# Patient Record
Sex: MALE | Race: OTHER | HISPANIC OR LATINO | URBAN - METROPOLITAN AREA
[De-identification: names, ages, dates, MRNs, and addresses within clinical notes are randomized per-mention and may not be internally consistent; named-entity substitution may affect disease eponyms.]

---

## 2020-10-14 ENCOUNTER — INPATIENT (INPATIENT)
Facility: HOSPITAL | Age: 40
LOS: 1 days | Discharge: HOME | DRG: 123 | End: 2020-10-16
Attending: HOSPITALIST | Admitting: HOSPITALIST
Payer: MEDICAID

## 2020-10-14 VITALS
WEIGHT: 235.01 LBS | HEART RATE: 61 BPM | DIASTOLIC BLOOD PRESSURE: 75 MMHG | HEIGHT: 66 IN | RESPIRATION RATE: 18 BRPM | TEMPERATURE: 98 F | OXYGEN SATURATION: 97 % | SYSTOLIC BLOOD PRESSURE: 145 MMHG

## 2020-10-14 DIAGNOSIS — H53.8 OTHER VISUAL DISTURBANCES: ICD-10-CM

## 2020-10-14 LAB
ALBUMIN SERPL ELPH-MCNC: 4.6 G/DL — SIGNIFICANT CHANGE UP (ref 3.3–5.2)
ALP SERPL-CCNC: 81 U/L — SIGNIFICANT CHANGE UP (ref 40–120)
ALT FLD-CCNC: 78 U/L — HIGH
AMPHET UR-MCNC: NEGATIVE — SIGNIFICANT CHANGE UP
ANION GAP SERPL CALC-SCNC: 10 MMOL/L — SIGNIFICANT CHANGE UP (ref 5–17)
AST SERPL-CCNC: 42 U/L — HIGH
BARBITURATES UR SCN-MCNC: NEGATIVE — SIGNIFICANT CHANGE UP
BASOPHILS # BLD AUTO: 0.05 K/UL — SIGNIFICANT CHANGE UP (ref 0–0.2)
BASOPHILS NFR BLD AUTO: 0.7 % — SIGNIFICANT CHANGE UP (ref 0–2)
BENZODIAZ UR-MCNC: NEGATIVE — SIGNIFICANT CHANGE UP
BILIRUB SERPL-MCNC: 0.3 MG/DL — LOW (ref 0.4–2)
BUN SERPL-MCNC: 18 MG/DL — SIGNIFICANT CHANGE UP (ref 8–20)
CALCIUM SERPL-MCNC: 9.5 MG/DL — SIGNIFICANT CHANGE UP (ref 8.6–10.2)
CHLORIDE SERPL-SCNC: 103 MMOL/L — SIGNIFICANT CHANGE UP (ref 98–107)
CO2 SERPL-SCNC: 27 MMOL/L — SIGNIFICANT CHANGE UP (ref 22–29)
COCAINE METAB.OTHER UR-MCNC: NEGATIVE — SIGNIFICANT CHANGE UP
CREAT SERPL-MCNC: 0.87 MG/DL — SIGNIFICANT CHANGE UP (ref 0.5–1.3)
CRP SERPL-MCNC: <0.4 MG/DL — SIGNIFICANT CHANGE UP (ref 0–0.4)
EOSINOPHIL # BLD AUTO: 0.22 K/UL — SIGNIFICANT CHANGE UP (ref 0–0.5)
EOSINOPHIL NFR BLD AUTO: 3 % — SIGNIFICANT CHANGE UP (ref 0–6)
ERYTHROCYTE [SEDIMENTATION RATE] IN BLOOD: 10 MM/HR — SIGNIFICANT CHANGE UP (ref 0–20)
GLUCOSE SERPL-MCNC: 107 MG/DL — HIGH (ref 70–99)
HCT VFR BLD CALC: 46 % — SIGNIFICANT CHANGE UP (ref 39–50)
HGB BLD-MCNC: 14.9 G/DL — SIGNIFICANT CHANGE UP (ref 13–17)
IMM GRANULOCYTES NFR BLD AUTO: 0.3 % — SIGNIFICANT CHANGE UP (ref 0–1.5)
LYMPHOCYTES # BLD AUTO: 2.58 K/UL — SIGNIFICANT CHANGE UP (ref 1–3.3)
LYMPHOCYTES # BLD AUTO: 35.1 % — SIGNIFICANT CHANGE UP (ref 13–44)
MCHC RBC-ENTMCNC: 29.9 PG — SIGNIFICANT CHANGE UP (ref 27–34)
MCHC RBC-ENTMCNC: 32.4 GM/DL — SIGNIFICANT CHANGE UP (ref 32–36)
MCV RBC AUTO: 92.4 FL — SIGNIFICANT CHANGE UP (ref 80–100)
METHADONE UR-MCNC: NEGATIVE — SIGNIFICANT CHANGE UP
MONOCYTES # BLD AUTO: 0.56 K/UL — SIGNIFICANT CHANGE UP (ref 0–0.9)
MONOCYTES NFR BLD AUTO: 7.6 % — SIGNIFICANT CHANGE UP (ref 2–14)
NEUTROPHILS # BLD AUTO: 3.91 K/UL — SIGNIFICANT CHANGE UP (ref 1.8–7.4)
NEUTROPHILS NFR BLD AUTO: 53.3 % — SIGNIFICANT CHANGE UP (ref 43–77)
OPIATES UR-MCNC: NEGATIVE — SIGNIFICANT CHANGE UP
PCP SPEC-MCNC: SIGNIFICANT CHANGE UP
PCP UR-MCNC: NEGATIVE — SIGNIFICANT CHANGE UP
PLATELET # BLD AUTO: 187 K/UL — SIGNIFICANT CHANGE UP (ref 150–400)
POTASSIUM SERPL-MCNC: 4.5 MMOL/L — SIGNIFICANT CHANGE UP (ref 3.5–5.3)
POTASSIUM SERPL-SCNC: 4.5 MMOL/L — SIGNIFICANT CHANGE UP (ref 3.5–5.3)
PROT SERPL-MCNC: 7.5 G/DL — SIGNIFICANT CHANGE UP (ref 6.6–8.7)
RBC # BLD: 4.98 M/UL — SIGNIFICANT CHANGE UP (ref 4.2–5.8)
RBC # FLD: 12.3 % — SIGNIFICANT CHANGE UP (ref 10.3–14.5)
SARS-COV-2 RNA SPEC QL NAA+PROBE: SIGNIFICANT CHANGE UP
SODIUM SERPL-SCNC: 140 MMOL/L — SIGNIFICANT CHANGE UP (ref 135–145)
THC UR QL: POSITIVE
WBC # BLD: 7.34 K/UL — SIGNIFICANT CHANGE UP (ref 3.8–10.5)
WBC # FLD AUTO: 7.34 K/UL — SIGNIFICANT CHANGE UP (ref 3.8–10.5)

## 2020-10-14 PROCEDURE — 99285 EMERGENCY DEPT VISIT HI MDM: CPT

## 2020-10-14 PROCEDURE — 99223 1ST HOSP IP/OBS HIGH 75: CPT

## 2020-10-14 RX ORDER — THIAMINE MONONITRATE (VIT B1) 100 MG
100 TABLET ORAL DAILY
Refills: 0 | Status: DISCONTINUED | OUTPATIENT
Start: 2020-10-14 | End: 2020-10-16

## 2020-10-14 RX ORDER — PANTOPRAZOLE SODIUM 20 MG/1
40 TABLET, DELAYED RELEASE ORAL
Refills: 0 | Status: DISCONTINUED | OUTPATIENT
Start: 2020-10-14 | End: 2020-10-16

## 2020-10-14 RX ORDER — ACETAMINOPHEN 500 MG
650 TABLET ORAL EVERY 6 HOURS
Refills: 0 | Status: DISCONTINUED | OUTPATIENT
Start: 2020-10-14 | End: 2020-10-16

## 2020-10-14 RX ORDER — FOLIC ACID 0.8 MG
1 TABLET ORAL DAILY
Refills: 0 | Status: DISCONTINUED | OUTPATIENT
Start: 2020-10-14 | End: 2020-10-16

## 2020-10-14 RX ORDER — NICOTINE POLACRILEX 2 MG
1 GUM BUCCAL DAILY
Refills: 0 | Status: DISCONTINUED | OUTPATIENT
Start: 2020-10-14 | End: 2020-10-16

## 2020-10-14 RX ADMIN — Medication 58 MILLIGRAM(S): at 15:56

## 2020-10-14 NOTE — CONSULT NOTE ADULT - SUBJECTIVE AND OBJECTIVE BOX
[facility]                                     Neurology at [place]                                 Amber Soto, & Dakota                                  [address]                                        [place], [place], [address]                                             [phone]    CC: right eye vision changes  HPI: The patient is a 39y Male who presented with 16 days of blurred vision in right eye with pain on movement.  He saw an optometrist who was concerned for optic neuritis and he was sent to the ED.  Neurology is asked to evaluate    PAST MEDICAL & SURGICAL HISTORY:  No pertinent past medical history    No significant past surgical history        MEDICATIONS  (STANDING):    MEDICATIONS  (PRN):  acetaminophen   Tablet .. 650 milliGRAM(s) Oral every 6 hours PRN Moderate Pain (4 - 6)      Allergies    No Known Allergies    Intolerances        SOCIAL HISTORY:  (+) tob,   social alcohol   no drugs    FAMILY HISTORY:  Family history of diabetes mellitus (DM) (Mother)  no CVA in either parent      ROS: 14 point ROS negative other than what is present in HPI or below    Vital Signs Last 24 Hrs  T(C): 36.8 (14 Oct 2020 13:54), Max: 36.8 (14 Oct 2020 13:54)  T(F): 98.3 (14 Oct 2020 13:54), Max: 98.3 (14 Oct 2020 13:54)  HR: 61 (14 Oct 2020 13:54) (61 - 61)  BP: 145/75 (14 Oct 2020 13:54) (145/75 - 145/75)  BP(mean): --  RR: 18 (14 Oct 2020 13:54) (18 - 18)  SpO2: 97% (14 Oct 2020 13:54) (97% - 97%)      General: NAD    Detailed Neurologic Exam:    Mental status: The patient is awake and alert and has normal attention span.  The patient is fully oriented in 3 spheres. The patient is oriented to current events. The patient is able to name objects, follow commands, repeat sentences.    Cranial nerves: Right APD, mild.  Blurred vision in right eye. There is no visual field deficit to confrontation. Extraocular motion is full with no nystagmus. There is no ptosis. Facial sensation is intact. Facial musculature is symmetric. Palate elevates symmetrically. Shoulder shrug is normal. Tongue is midline.  Right optic nerve pallor on fundoscopic exam    Motor: There is normal bulk and tone.  There is no tremor.  Strength is 5/5 in the right arm and leg.   Strength is 5/5 in the left arm and leg.    Sensation: Intact to light touch and pin in 4 extremities    Reflexes: 2+ throughout and plantar responses are flexor.    Cerebellar: There is no dysmetria on finger to nose testing.    Gait : deferred    LABS:                         14.9   7.34  )-----------( 187      ( 14 Oct 2020 16:10 )             46.0       10-14    140  |  103  |  18.0  ----------------------------<  107<H>  4.5   |  27.0  |  0.87    Ca    9.5      14 Oct 2020 16:10    TPro  7.5  /  Alb  4.6  /  TBili  0.3<L>  /  DBili  x   /  AST  42<H>  /  ALT  78<H>  /  AlkPhos  81  10-14      RADIOLOGY & ADDITIONAL STUDIES (independently reviewed unless otherwise noted):  no neuro studies

## 2020-10-14 NOTE — ED PROVIDER NOTE - CLINICAL SUMMARY MEDICAL DECISION MAKING FREE TEXT BOX
right blurry vision for two weeks; optic swelling on eye exam; labs, MRI right blurry vision for two weeks; optic swelling on eye exam; labs, MRI; admit to obs

## 2020-10-14 NOTE — H&P ADULT - NSICDXFAMILYHX_GEN_ALL_CORE_FT
FAMILY HISTORY:  Mother  Still living? Unknown  Family history of diabetes mellitus (DM), Age at diagnosis: Age Unknown

## 2020-10-14 NOTE — ED PROVIDER NOTE - PROGRESS NOTE DETAILS
Patient signed out to me by Dr. Lema- patient with acute, painful R-sided vision loss x 3 days, seen by optometrist who examined patient, found vision loss, optic nerve swelling. Patient with 20/200 R eye, 20/25 L eye. D/w Dr. Clark (neurology)- history and exam c/w optic neuritis. Recommending IV solu-medrol x 3 days and admission to hospital. Will see patient in ED. Will admit patient ot hospital pending labs. Maggi Candelario DO PA NOTE: Case discussed with MD Cochran Patient signed out to me by Dr. Lema- patient with acute, painful R-sided vision loss x 3 days, seen by optometrist who examined patient, found vision loss, optic nerve swelling. Patient with visual acuity 20/200 R eye, 20/25 L eye. Unable to visualize optic disk on fundoscopic exam. D/w Dr. Clakr (neurology)- history and exam c/w optic neuritis. Recommending IV solu-medrol x 3 days and admission to hospital. Will see patient in ED. Will admit patient ot hospital pending labs. Maggi Candelario DO

## 2020-10-14 NOTE — ED PROVIDER NOTE - NSTIMEPROVIDERCAREINITIATE_GEN_ER
Bedside and Verbal shift change received from Donn Burgess RN. Report included the following information: SBAR, ED Summary, MAR and Recent Results. Patient just returned from x-ray. Asking for pain medication.  Will consult MD.
Bedside shift change report given to Kurtis Tipton 44  (oncoming nurse) by Harriett Rush RN  (offgoing nurse). Report included the following information SBAR, Kardex and ED Summary.
Discharge instructions reviewed with patient. Discharge instructions given to patient per Dr. Lorilee Spurling. Patient able to return/verbalize discharge instructions. Copy of discharge instructions provided. Patient condition stable, respiratory status within normal limits, neuro status intact. Ambulatory out of ER.  Transportation home provided by 
Patient resting comfortably in stretcher. No concerns at this time.
Pt states she was having chest pain yesterday that lasted a few hours and she states she ahd small amount of relief after taking propanolol. Pain started again around 0300 with out relief. Pt has Cardiac history of rapid HR and sees MD Antonia Ernst. Pt has hx of total hysterectomy. Denies shortness of breath. VS stable.
14-Oct-2020 14:15

## 2020-10-14 NOTE — H&P ADULT - ASSESSMENT
38 y/o M with no PMH was sent by his Ophthalmologist for the right side decrease vision    Plan:     Right eye vision loss r/o Optic neuritis: Admit under medicine, Neuro checks, pulse steroids, will follow Neurologist called by ED, will get TSH, Hba1c and lipid panel, will follow MRI of brain orbit ordered by ED      Hx of smoking, will give nicotine patches, counselled for cessation.     Hx of drinking beers: will monitor for withdrawal, will supplement thiamine, folic acid and multivitamin.     Hx of use weed, will get Utox.     DVT prophylaxis: ambulation, SCDs    GI prophylaxis: Protonix    Dispo: will see what MRI shows then accordingly.    40 y/o M with no PMH was sent by his Ophthalmologist for the right side decrease vision    Plan:     Right eye vision loss r/o Optic neuritis: Admit under medicine, Neuro checks, pulse steroids, will follow Neurologist called by ED, will get TSH, Hba1c and lipid panel, will follow MRI of brain, orbit, cervical and lumber spine, will get ESR, CRP, CAHNTE, there is no temporal headache.      Hx of smoking, will give nicotine patches, counselled for cessation.     Hx of drinking beers: will monitor for withdrawal, will supplement thiamine, folic acid and multivitamin.     Hx of use weed, will get Utox.     DVT prophylaxis: ambulation, SCDs    GI prophylaxis: Protonix oral    Dispo: will see what MRI shows then go accordingly.

## 2020-10-14 NOTE — H&P ADULT - NSHPPHYSICALEXAM_GEN_ALL_CORE
Vital Signs Last 24 Hrs  T(C): 36.8 (14 Oct 2020 13:54), Max: 36.8 (14 Oct 2020 13:54)  T(F): 98.3 (14 Oct 2020 13:54), Max: 98.3 (14 Oct 2020 13:54)  HR: 61 (14 Oct 2020 13:54) (61 - 61)  BP: 145/75 (14 Oct 2020 13:54) (145/75 - 145/75)  BP(mean): --  RR: 18 (14 Oct 2020 13:54) (18 - 18)  SpO2: 97% (14 Oct 2020 13:54) (97% - 97%)    PHYSICAL EXAM:    GENERAL: Elderly male looking   HEENT: PERRL, +EOMI  NECK: soft, Supple, No JVD,   CHEST/LUNG: Clear to auscultate bilaterally; No wheezing  HEART: S1S2+, Regular rate and rhythm; No murmurs, rubs, or gallops  ABDOMEN: Soft, Nontender, Nondistended; Bowel sounds present  EXTREMITIES:  2+ Peripheral Pulses, No clubbing, cyanosis, or edema  SKIN: No rashes or lesions  NEURO: AAOX3, no focal deficits, no motor r sensory loss  PSYCH: normal mood Vital Signs Last 24 Hrs  T(C): 36.8 (14 Oct 2020 13:54), Max: 36.8 (14 Oct 2020 13:54)  T(F): 98.3 (14 Oct 2020 13:54), Max: 98.3 (14 Oct 2020 13:54)  HR: 61 (14 Oct 2020 13:54) (61 - 61)  BP: 145/75 (14 Oct 2020 13:54) (145/75 - 145/75)  RR: 18 (14 Oct 2020 13:54) (18 - 18)  SpO2: 97% (14 Oct 2020 13:54) (97% - 97%)    PHYSICAL EXAM:    GENERAL: Young male looking comfortable   HEENT: atraumatic, pupils reactive to the light, EOMI, no conjunctival injection or redness   NECK: soft, Supple, No JVD,   CHEST/LUNG: Clear to auscultate bilaterally; No wheezing  HEART: S1S2+, Regular rate and rhythm; No murmurs  ABDOMEN: Soft, Nontender, Nondistended; Bowel sounds present  EXTREMITIES:  2+ Peripheral Pulses, No edema  SKIN: No rashes or lesions  NEURO: AAOX3, no focal deficits, no motor r sensory loss, his visual field in intact, no  facial droop  PSYCH: normal mood

## 2020-10-14 NOTE — ED ADULT TRIAGE NOTE - CHIEF COMPLAINT QUOTE
Pt c/o right eye pain, blurred vision x 2 weeks.  Sent from PMD.  Referral states exam showed optic nerve swelling.  Requesting MRI.

## 2020-10-14 NOTE — ED CDU PROVIDER INITIAL DAY NOTE - FAMILY HISTORY
Mother  Still living? Unknown  Family history of diabetes mellitus (DM), Age at diagnosis: Age Unknown

## 2020-10-14 NOTE — ED ADULT NURSE NOTE - OBJECTIVE STATEMENT
assumed pt care at 1500. Pt A&Ox 4 states he "lost vision in right eye" 2 weeks ago. Pt states he can see "some" it is just dark and blurry. Pt denies any other symptoms at this time. Denies any chest pain, SOB, N/V/D, HA, dizziness, numbness/tingling,  symptoms. No s/s of respiratory distress noted- respirations even & unlabored. Safety maintained. Will continue to monitor.

## 2020-10-14 NOTE — CONSULT NOTE ADULT - ASSESSMENT
The patient is a 39y Male who is followed by neurology because of right optic neuritis    Optic neuritis  IV solumedrol 1 gram daily for three doses  MRI brain/orbits, Cervical and thoracic spine wwo steffany  need to evaluate for MS and Devics disease although has no previous history to suggest MS  will send NMO antibodies    will follow with you    Edwin Clark MD PhD   [id number]

## 2020-10-14 NOTE — ED CDU PROVIDER INITIAL DAY NOTE - OBJECTIVE STATEMENT
Pt is a 38 y/o m, denies PMH, presents to ED c/o blurry vision x 2 weeks. Pt states he has been having progressively worsened vision in his R eye for the last 2 weeks. Pt was seen by his optometrist ( Dr. Suze Escobedo ) today and noted to have optic nerve swelling and sent to the ED for an MRI to assess for optic neuritis. Pt has no complaints of pain at this time. Denies HA, dizziness, nausea, vomiting.

## 2020-10-14 NOTE — H&P ADULT - NSHPREVIEWOFSYSTEMS_GEN_ALL_CORE
CONSTITUTIONAL: No fever, no fatigue  RESPIRATORY: No cough, No shortness of breath  CARDIOVASCULAR: No chest pain, palpitations  GASTROINTESTINAL: No abdominal, No nausea, vomiting  NEUROLOGICAL: No headaches,  loss of strength.  MISCELLANEOUS: No joint swelling or pain

## 2020-10-14 NOTE — ED CDU PROVIDER INITIAL DAY NOTE - MEDICAL DECISION MAKING DETAILS
39m sent from optometrist office for MRI to assess for optic neuritis. Pt with R eye blurry vision x 2 weeks.

## 2020-10-14 NOTE — H&P ADULT - HISTORY OF PRESENT ILLNESS
40 y/o M with no PMH was sent by his Ophthalmologist for the right side decrease vision 38 y/o M with no PMH was sent by his Ophthalmologist for the right side blurry vision for 16 days with pain on movement, as per patient it has been worsening, he saw the optalmologist he sent him to the ED to get MRI done, as per patient, he has no weakness, numbness, dizziness, some time get some headache, has no fever, chills, chest pain, joint pain, rashes, he has no watering of eye or redness or the eye, he has no issues with his left eye, he has no recent travel, sick contact, no hx of trauma.

## 2020-10-14 NOTE — H&P ADULT - NSHPLABSRESULTS_GEN_ALL_CORE
14.9   7.34  )-----------( 187      ( 14 Oct 2020 16:10 )             46.0     10-14    140  |  103  |  18.0  ----------------------------<  107<H>  4.5   |  27.0  |  0.87    Ca    9.5      14 Oct 2020 16:10    TPro  7.5  /  Alb  4.6  /  TBili  0.3<L>  /  DBili  x   /  AST  42<H>  /  ALT  78<H>  /  AlkPhos  81  10-14

## 2020-10-14 NOTE — ED ADULT NURSE NOTE - ED STAT RN HANDOFF DETAILS
report given to ARLENE Erwin in holding room. Pt stable, no complaints at this time. Awaiting MRI's, pt aware of plan of care. NAD noted. Safety maintained

## 2020-10-15 LAB
A1C WITH ESTIMATED AVERAGE GLUCOSE RESULT: 6.3 % — HIGH (ref 4–5.6)
ALBUMIN SERPL ELPH-MCNC: 4.7 G/DL — SIGNIFICANT CHANGE UP (ref 3.3–5.2)
ALP SERPL-CCNC: 69 U/L — SIGNIFICANT CHANGE UP (ref 40–120)
ALT FLD-CCNC: 70 U/L — HIGH
ANION GAP SERPL CALC-SCNC: 15 MMOL/L — SIGNIFICANT CHANGE UP (ref 5–17)
AST SERPL-CCNC: 31 U/L — SIGNIFICANT CHANGE UP
BILIRUB SERPL-MCNC: 0.3 MG/DL — LOW (ref 0.4–2)
BUN SERPL-MCNC: 19 MG/DL — SIGNIFICANT CHANGE UP (ref 8–20)
CALCIUM SERPL-MCNC: 9.2 MG/DL — SIGNIFICANT CHANGE UP (ref 8.6–10.2)
CHLORIDE SERPL-SCNC: 103 MMOL/L — SIGNIFICANT CHANGE UP (ref 98–107)
CHOLEST SERPL-MCNC: 188 MG/DL — SIGNIFICANT CHANGE UP (ref 110–199)
CO2 SERPL-SCNC: 20 MMOL/L — LOW (ref 22–29)
CREAT SERPL-MCNC: 0.58 MG/DL — SIGNIFICANT CHANGE UP (ref 0.5–1.3)
CRP SERPL-MCNC: <0.4 MG/DL — SIGNIFICANT CHANGE UP (ref 0–0.4)
ERYTHROCYTE [SEDIMENTATION RATE] IN BLOOD: 10 MM/HR — SIGNIFICANT CHANGE UP (ref 0–20)
ESTIMATED AVERAGE GLUCOSE: 134 MG/DL — HIGH (ref 68–114)
GLUCOSE SERPL-MCNC: 184 MG/DL — HIGH (ref 70–99)
HCT VFR BLD CALC: 44.6 % — SIGNIFICANT CHANGE UP (ref 39–50)
HDLC SERPL-MCNC: 54 MG/DL — SIGNIFICANT CHANGE UP
HGB BLD-MCNC: 14.8 G/DL — SIGNIFICANT CHANGE UP (ref 13–17)
INR BLD: 1.07 RATIO — SIGNIFICANT CHANGE UP (ref 0.88–1.16)
LIPID PNL WITH DIRECT LDL SERPL: 125 MG/DL — SIGNIFICANT CHANGE UP
MAGNESIUM SERPL-MCNC: 2 MG/DL — SIGNIFICANT CHANGE UP (ref 1.6–2.6)
MCHC RBC-ENTMCNC: 29.8 PG — SIGNIFICANT CHANGE UP (ref 27–34)
MCHC RBC-ENTMCNC: 33.2 GM/DL — SIGNIFICANT CHANGE UP (ref 32–36)
MCV RBC AUTO: 89.9 FL — SIGNIFICANT CHANGE UP (ref 80–100)
PHOSPHATE SERPL-MCNC: 3.1 MG/DL — SIGNIFICANT CHANGE UP (ref 2.4–4.7)
PLATELET # BLD AUTO: 177 K/UL — SIGNIFICANT CHANGE UP (ref 150–400)
POTASSIUM SERPL-MCNC: 4.5 MMOL/L — SIGNIFICANT CHANGE UP (ref 3.5–5.3)
POTASSIUM SERPL-SCNC: 4.5 MMOL/L — SIGNIFICANT CHANGE UP (ref 3.5–5.3)
PROT SERPL-MCNC: 7.7 G/DL — SIGNIFICANT CHANGE UP (ref 6.6–8.7)
PROTHROM AB SERPL-ACNC: 12.4 SEC — SIGNIFICANT CHANGE UP (ref 10.6–13.6)
RBC # BLD: 4.96 M/UL — SIGNIFICANT CHANGE UP (ref 4.2–5.8)
RBC # FLD: 11.9 % — SIGNIFICANT CHANGE UP (ref 10.3–14.5)
SARS-COV-2 IGG SERPL QL IA: NEGATIVE — SIGNIFICANT CHANGE UP
SARS-COV-2 IGM SERPL IA-ACNC: 0.11 INDEX — SIGNIFICANT CHANGE UP
SODIUM SERPL-SCNC: 138 MMOL/L — SIGNIFICANT CHANGE UP (ref 135–145)
TOTAL CHOLESTEROL/HDL RATIO MEASUREMENT: 3 RATIO — LOW (ref 3.4–9.6)
TRIGL SERPL-MCNC: 43 MG/DL — SIGNIFICANT CHANGE UP (ref 10–200)
TSH SERPL-MCNC: 0.35 UIU/ML — SIGNIFICANT CHANGE UP (ref 0.27–4.2)
WBC # BLD: 8.44 K/UL — SIGNIFICANT CHANGE UP (ref 3.8–10.5)
WBC # FLD AUTO: 8.44 K/UL — SIGNIFICANT CHANGE UP (ref 3.8–10.5)

## 2020-10-15 PROCEDURE — 72156 MRI NECK SPINE W/O & W/DYE: CPT | Mod: 26

## 2020-10-15 PROCEDURE — 99232 SBSQ HOSP IP/OBS MODERATE 35: CPT

## 2020-10-15 PROCEDURE — 72157 MRI CHEST SPINE W/O & W/DYE: CPT | Mod: 26

## 2020-10-15 PROCEDURE — 70553 MRI BRAIN STEM W/O & W/DYE: CPT | Mod: 26

## 2020-10-15 PROCEDURE — 99233 SBSQ HOSP IP/OBS HIGH 50: CPT

## 2020-10-15 RX ADMIN — Medication 1 MILLIGRAM(S): at 12:48

## 2020-10-15 RX ADMIN — Medication 100 MILLIGRAM(S): at 11:03

## 2020-10-15 RX ADMIN — Medication 1 TABLET(S): at 11:03

## 2020-10-15 RX ADMIN — Medication 58 MILLIGRAM(S): at 05:17

## 2020-10-15 RX ADMIN — Medication 1 PATCH: at 11:03

## 2020-10-15 RX ADMIN — Medication 1 MILLIGRAM(S): at 11:03

## 2020-10-15 RX ADMIN — Medication 1 PATCH: at 19:30

## 2020-10-15 RX ADMIN — PANTOPRAZOLE SODIUM 40 MILLIGRAM(S): 20 TABLET, DELAYED RELEASE ORAL at 05:17

## 2020-10-15 NOTE — PROGRESS NOTE ADULT - SUBJECTIVE AND OBJECTIVE BOX
[facility]                                     Neurology at [place]                                 Amber Soto, & [place]                                  [address]                                        [place], [place], [address]                                             [phone]    CC: right eye vision changes  HPI: The patient is a 39y Male who presented with 16 days of blurred vision in right eye with pain on movement.  He saw an optometrist who was concerned for optic neuritis and he was sent to the ED.  Neurology is asked to evaluate    Interval history: reports no more eye pain with movement, vision improved, but still blurred, tolerated steroids so far, refused MRI due to claustrophobia    Review of systems (neurology): Denies headache or dizziness. Denies weakness/numbness.  Denies speech/language deficits. (+) right eye blurred vision.  Denies confusion    MEDICATIONS  (STANDING):  folic acid 1 milliGRAM(s) Oral daily  methylPREDNISolone sodium succinate IVPB 1000 milliGRAM(s) IV Intermittent daily  multivitamin 1 Tablet(s) Oral daily  nicotine -   7 mG/24Hr(s) Patch 1 patch Transdermal daily  pantoprazole    Tablet 40 milliGRAM(s) Oral before breakfast  thiamine 100 milliGRAM(s) Oral daily    MEDICATIONS  (PRN):  acetaminophen   Tablet .. 650 milliGRAM(s) Oral every 6 hours PRN Moderate Pain (4 - 6)  LORazepam     Tablet 2 milliGRAM(s) Oral every 2 hours PRN CIWA-Ar score increase by 2 points and a total score of 7 or less      Vital Signs Last 24 Hrs  T(C): 36.7 (15 Oct 2020 11:33), Max: 36.7 (15 Oct 2020 11:33)  T(F): 98.1 (15 Oct 2020 11:33), Max: 98.1 (15 Oct 2020 11:33)  HR: 60 (15 Oct 2020 11:33) (60 - 72)  BP: 129/79 (15 Oct 2020 11:33) (120/65 - 134/68)  BP(mean): --  RR: 18 (15 Oct 2020 11:33) (18 - 18)  SpO2: 96% (15 Oct 2020 11:33) (94% - 96%)    Detailed neuro exam:    Mental status: The patient is awake and alert and has normal attention span.  The patient is fully oriented in 3 spheres. The patient is oriented to current events. The patient is able to name objects, follow commands, repeat sentences.    Cranial nerves: Right APD, mild same as 10/14/2020.  Blurred vision in right eye, subjective improvement form 10/14/2020. There is no visual field deficit to confrontation. Extraocular motion is full with no nystagmus. There is no ptosis. Facial sensation is intact. Facial musculature is symmetric. Palate elevates symmetrically. Shoulder shrug is normal. Tongue is midline.      Motor: There is normal bulk and tone.  There is no tremor.  Strength is 5/5 in the right arm and leg.   Strength is 5/5 in the left arm and leg.    Sensation: Intact to light touch and pin in 4 extremities    Reflexes: 2+ throughout and plantar responses are flexor.    Cerebellar: There is no dysmetria on finger to nose testing.    Gait : deferred    LABS:                         14.8   8.44  )-----------( 177      ( 15 Oct 2020 03:15 )             44.6     10-15    138  |  103  |  19.0  ----------------------------<  184<H>  4.5   |  20.0<L>  |  0.58    Ca    9.2      15 Oct 2020 03:15  Phos  3.1     10-15  Mg     2.0     10-15    TPro  7.7  /  Alb  4.7  /  TBili  0.3<L>  /  DBili  x   /  AST  31  /  ALT  70<H>  /  AlkPhos  69  10-15    LIVER FUNCTIONS - ( 15 Oct 2020 03:15 )  Alb: 4.7 g/dL / Pro: 7.7 g/dL / ALK PHOS: 69 U/L / ALT: 70 U/L / AST: 31 U/L / GGT: x           PT/INR - ( 15 Oct 2020 03:15 )   PT: 12.4 sec;   INR: 1.07 ratio        RADIOLOGY & ADDITIONAL STUDIES (independently reviewed unless otherwise noted):  no neuro studies

## 2020-10-15 NOTE — PATIENT PROFILE ADULT - NSPROGENBLOODRESTRICT_GEN_A_NUR
Telephone Encounter by Elmira Carter APN at 09/26/17 12:04 PM     Author:  Elmira Carter APN Service:  (none) Author Type:  Nurse Practitioner     Filed:  09/26/17 12:05 PM Encounter Date:  9/26/2017 Status:  Signed     :  Elmira Carter APN (Nurse Practitioner)            Ok to refill albuterol neb solution.   Keep appointment on Saturday.[TL1.1M]   Electronically Signed by:    NITHYA Fontenot , 9/26/2017[TL1.2T]        Revision History        User Key Date/Time User Provider Type Action    > TL1.2 09/26/17 12:05 PM Elmira Carter APN Nurse Practitioner Sign     TL1.1 09/26/17 12:04 PM Elmira Carter APN Nurse Practitioner     M - Manual, T - Template             none

## 2020-10-15 NOTE — PROGRESS NOTE ADULT - ASSESSMENT
The patient is a 39y Male who is followed by neurology because of right optic neuritis    Optic neuritis  IV solumedrol 1 gram daily for three doses dose 2/3 today 10/1/5/2020  MRI brain/orbits, Cervical and thoracic spine wwo steffany- will need benzodiazepine, if still not able to tolerate we can arrange for open MRI as outpatient  need to evaluate for MS and Devics disease although has no previous history to suggest MS  will send NMO antibodies    will follow with you    Edwin Clark MD PhD   [id number]

## 2020-10-15 NOTE — PROGRESS NOTE ADULT - ASSESSMENT
40 y/o M with no PMH was sent by his Ophthalmologist for the right side decrease vision.     Plan:     Right eye vision loss r/o Optic neuritis: Admit under medicine, Neuro checks, pulse steroids, will follow Neurologist called by ED, TSH is WNL, Hba1c 6.3 and lipid panel shows , patient could not do MRI yesterday because he was anxious, he was told that he will be provided antivan before the procedure, will get MRIs of brain, orbit, cervical and lumber spine, ESR, CRP normal, CHANTE and GAYLE antibodies are pending.       Hx of smoking, will give nicotine patches, counselled for cessation.     Hx of drinking beers: will monitor for withdrawal, will supplement thiamine, folic acid and multivitamin.     Hx of use weed, Utox done showed positive for THC     DVT prophylaxis: ambulation, SCDs    GI prophylaxis: Protonix oral    Dispo: will continue with UTox

## 2020-10-15 NOTE — PROGRESS NOTE ADULT - SUBJECTIVE AND OBJECTIVE BOX
MARK GAINES    [id number]    39y      Male    Patient is a 39y old  Male who presents with a chief complaint of Right side decrease vision (14 Oct 2020 17:40)      INTERVAL HPI/OVERNIGHT EVENTS:    Patient is feeling some improvement of blurry vision, he has no weakness, numbness, tingling, he could not do his MRI as he was anxious.     REVIEW OF SYSTEMS:    CONSTITUTIONAL: No fever, some fatigue  RESPIRATORY: No cough, No shortness of breath  CARDIOVASCULAR: No chest pain, palpitations  GASTROINTESTINAL: No abdominal, No nausea, vomiting  NEUROLOGICAL: No headaches,  loss of strength.  MISCELLANEOUS: No joint swelling or pain       Vital Signs Last 24 Hrs  T(C): 36.4 (15 Oct 2020 08:06), Max: 36.8 (14 Oct 2020 13:54)  T(F): 97.6 (15 Oct 2020 08:06), Max: 98.3 (14 Oct 2020 13:54)  HR: 72 (15 Oct 2020 08:06) (60 - 72)  BP: 134/68 (15 Oct 2020 08:06) (120/65 - 145/75)  RR: 18 (15 Oct 2020 08:06) (18 - 18)  SpO2: 94% (15 Oct 2020 08:06) (94% - 97%)    PHYSICAL EXAM:    PHYSICAL EXAM:    GENERAL: Young male looking comfortable   HEENT: atraumatic, pupils reactive to the light, EOMI, no conjunctival injection or redness   NECK: soft, Supple, No JVD,   CHEST/LUNG: Clear to auscultate bilaterally; No wheezing  HEART: S1S2+, Regular rate and rhythm; No murmurs  ABDOMEN: Soft, Nontender, Nondistended; Bowel sounds present  EXTREMITIES:  2+ Peripheral Pulses, No edema  SKIN: No rashes or lesions  NEURO: AAOX3, no focal deficits, no motor r sensory loss, his visual field in intact, no  facial droop  PSYCH: normal mood      LABS:                        14.8   8.44  )-----------( 177      ( 15 Oct 2020 03:15 )             44.6     10-15    138  |  103  |  19.0  ----------------------------<  184<H>  4.5   |  20.0<L>  |  0.58    Ca    9.2      15 Oct 2020 03:15  Phos  3.1     10-15  Mg     2.0     10-15    TPro  7.7  /  Alb  4.7  /  TBili  0.3<L>  /  DBili  x   /  AST  31  /  ALT  70<H>  /  AlkPhos  69  10-15    PT/INR - ( 15 Oct 2020 03:15 )   PT: 12.4 sec;   INR: 1.07 ratio                 I&O's Summary      MEDICATIONS  (STANDING):  folic acid 1 milliGRAM(s) Oral daily  LORazepam   Injectable 1 milliGRAM(s) IV Push once  methylPREDNISolone sodium succinate IVPB 1000 milliGRAM(s) IV Intermittent daily  multivitamin 1 Tablet(s) Oral daily  nicotine -   7 mG/24Hr(s) Patch 1 patch Transdermal daily  pantoprazole    Tablet 40 milliGRAM(s) Oral before breakfast  thiamine 100 milliGRAM(s) Oral daily    MEDICATIONS  (PRN):  acetaminophen   Tablet .. 650 milliGRAM(s) Oral every 6 hours PRN Moderate Pain (4 - 6)  LORazepam     Tablet 2 milliGRAM(s) Oral every 2 hours PRN CIWA-Ar score increase by 2 points and a total score of 7 or less

## 2020-10-16 VITALS
HEART RATE: 61 BPM | DIASTOLIC BLOOD PRESSURE: 82 MMHG | TEMPERATURE: 98 F | SYSTOLIC BLOOD PRESSURE: 140 MMHG | RESPIRATION RATE: 18 BRPM | OXYGEN SATURATION: 98 %

## 2020-10-16 LAB — ANA TITR SER: NEGATIVE — SIGNIFICANT CHANGE UP

## 2020-10-16 PROCEDURE — 99239 HOSP IP/OBS DSCHRG MGMT >30: CPT

## 2020-10-16 PROCEDURE — 99233 SBSQ HOSP IP/OBS HIGH 50: CPT

## 2020-10-16 RX ORDER — PANTOPRAZOLE SODIUM 20 MG/1
1 TABLET, DELAYED RELEASE ORAL
Qty: 10 | Refills: 0
Start: 2020-10-16 | End: 2020-10-25

## 2020-10-16 RX ORDER — THIAMINE MONONITRATE (VIT B1) 100 MG
1 TABLET ORAL
Qty: 0 | Refills: 0 | DISCHARGE
Start: 2020-10-16

## 2020-10-16 RX ORDER — NICOTINE POLACRILEX 2 MG
1 GUM BUCCAL
Qty: 18 | Refills: 0
Start: 2020-10-16 | End: 2020-11-02

## 2020-10-16 RX ORDER — FOLIC ACID 0.8 MG
1 TABLET ORAL
Qty: 0 | Refills: 0 | DISCHARGE
Start: 2020-10-16

## 2020-10-16 RX ADMIN — Medication 1 PATCH: at 11:21

## 2020-10-16 RX ADMIN — Medication 58 MILLIGRAM(S): at 05:55

## 2020-10-16 RX ADMIN — Medication 1 TABLET(S): at 11:21

## 2020-10-16 RX ADMIN — Medication 1 PATCH: at 11:20

## 2020-10-16 RX ADMIN — Medication 1 PATCH: at 08:00

## 2020-10-16 RX ADMIN — Medication 1 MILLIGRAM(S): at 11:21

## 2020-10-16 RX ADMIN — PANTOPRAZOLE SODIUM 40 MILLIGRAM(S): 20 TABLET, DELAYED RELEASE ORAL at 05:56

## 2020-10-16 RX ADMIN — Medication 100 MILLIGRAM(S): at 11:21

## 2020-10-16 NOTE — DISCHARGE NOTE PROVIDER - PROVIDER TOKENS
FREE:[LAST:[[place]],PHONE:[(   )    -],FAX:[(   )    -],ADDRESS:[Optjhalmologist on coming monday 19th, please call his office and get an appiontment.]],PROVIDER:[TOKEN:[[id number]]],FREE:[LAST:[PMD],PHONE:[(   )    -],FAX:[(   )    -],ADDRESS:[in 1 week, please call the office and get an appiontment]]

## 2020-10-16 NOTE — DISCHARGE NOTE PROVIDER - CARE PROVIDERS DIRECT ADDRESSES
,DirectAddress_Unknown,[email or web address],DirectAddress_Unknown

## 2020-10-16 NOTE — DISCHARGE NOTE NURSING/CASE MANAGEMENT/SOCIAL WORK - NSDCFUADDAPPT_GEN_ALL_CORE_FT
[facility]  OCTOBER 23 11:45 am   Dr Jael Benjamin   [facility]

## 2020-10-16 NOTE — DISCHARGE NOTE PROVIDER - NSDCCPCAREPLAN_GEN_ALL_CORE_FT
PRINCIPAL DISCHARGE DIAGNOSIS  Diagnosis: Blurry vision, right eye  Assessment and Plan of Treatment:

## 2020-10-16 NOTE — DISCHARGE NOTE PROVIDER - HOSPITAL COURSE
38 y/o M with no PMH was sent by his Ophthalmologist for the right side blurry vision for 16 days with pain on movement, as per patient it has been worsening, he saw the ophthalmologists he sent him to the ED to get MRI done, as per patient, he has no weakness, numbness, dizziness, rashes, he has no watering of eye or redness or the eye, he has no issues with his left eye, he has no recent travel, sick contact, no hx of trauma, patient was admitted under medicine and was started on IV steroids, Neuro checks done showed no neurological deficit, TSH is WNL, Hba1c 6.3 and lipid panel shows ,    MRIs done showed MRI ORBITS: Patient motion artifact limits detailed assessment. Focal enhancement and T2/STIR hyperintense signal abnormality involving the right intraorbital optic nerve, findings consistent with right optic neuritis, clinical correlation is advised.  MRI BRAIN: Single focus of T2/FLAIR hyperintense signal abnormality in the left parietal deep white matter. No abnormal intracranial enhancement.  MRI CERVICAL SPINE: No intrinsic cord signal abnormality in the cervical spine. No evidence of abnormal enhancement.  MRI THORACIC SPINE: No intrinsic cord signal abnormality in the thoracic spine. No evidence of abnormal enhancement.  patient has finished IV solumedrol 1 gram for three doses, MRIs were reviewed by Neurology, as per him MRIs as above not suggestive of multiple sclerosis.  Will need continued surveillance as outpatient, NMO antibodies pending, they will follow, would advise no driving until cleared by ophthalmology.     He has hx of  smoking, he was given nicotine patches and counselled for cessation.   He has Hx of drinking beers, he was monitored for withdrawal and was supplemented with thiamine, folic acid and multivitamin.     He has Hx of use weed, Utox done showed positive for THC, counselled for cessation.      Patient is doing ok, he feel some improvement of his vision from his right eye, he is being discharged home in a stable condition.     Vital Signs Last 24 Hrs  T(C): 36.3 (16 Oct 2020 07:45), Max: 36.6 (15 Oct 2020 17:50)  T(F): 97.3 (16 Oct 2020 07:45), Max: 97.8 (15 Oct 2020 17:50)  HR: 57 (16 Oct 2020 07:45) (57 - 76)  BP: 123/80 (16 Oct 2020 07:45) (123/65 - 153/75)  RR: 20 (16 Oct 2020 07:45) (16 - 20)  SpO2: 100% (16 Oct 2020 07:45) (95% - 100%)      PHYSICAL EXAM:    GENERAL: Young male looking comfortable   HEENT: atraumatic, pupils reactive to the light, EOMI, no conjunctival injection or redness   NECK: soft, Supple, No JVD,   CHEST/LUNG: Clear to auscultate bilaterally; No wheezing  HEART: S1S2+, Regular rate and rhythm; No murmurs  ABDOMEN: Soft, Nontender, Nondistended; Bowel sounds present  EXTREMITIES:  2+ Peripheral Pulses, No edema  SKIN: No rashes or lesions  NEURO: AAOX3, no focal deficits, no motor r sensory loss, his visual field in intact, no  facial droop  PSYCH: normal mood    Total time spent 40minutes

## 2020-10-16 NOTE — DISCHARGE NOTE PROVIDER - CARE PROVIDER_API CALL
Suze Briggs,   Optjhalmologist on coming monday 19th, please call his office and get an appiontment.  Phone: (   )    -  Fax: (   )    -  Follow Up Time:     Edwin Clark  NEUROLOGY  [address] [place] [address], [facility] [address]  [place] [address]  Phone: [phone]  Fax: [phone]  Follow Up Time:     PMD,   in 1 week, please call the office and get an appiontment  Phone: (   )    -  Fax: (   )    -  Follow Up Time:

## 2020-10-16 NOTE — DISCHARGE NOTE NURSING/CASE MANAGEMENT/SOCIAL WORK - PATIENT PORTAL LINK FT
You can access the FollowMyHealth Patient Portal offered by [facility] by registering at the following website: [email or web address]. By joining "[employer]’s FollowMyHealth portal, you will also be able to view your health information using other applications (apps) compatible with our system.

## 2020-10-16 NOTE — DISCHARGE NOTE PROVIDER - NSDCMRMEDTOKEN_GEN_ALL_CORE_FT
folic acid 1 mg oral tablet: 1 tab(s) orally once a day  Multiple Vitamins oral tablet: 1 tab(s) orally once a day  nicotine 7 mg/24 hr transdermal film, extended release: 1 patch transdermal once a day   Protonix 40 mg oral delayed release tablet: 1 tab(s) orally once a day   thiamine 100 mg oral tablet: 1 tab(s) orally once a day

## 2020-10-16 NOTE — PROGRESS NOTE ADULT - SUBJECTIVE AND OBJECTIVE BOX
[facility]                                        [facility] [place]                                 Amber Soto, & [place]                                  [address]                                        [place], [place], [address]                                             [phone]        CC: Optic neuritis    HPI:   The patient is a 39y Male who presented with 16 days of blurred vision in right eye with pain on movement.    He saw an optometrist who was concerned for optic neuritis and he was sent to the ED.    Interim history:  Remains on 6 [place].   Today is day 3/3 of IV Solu-medrol.  Reports vision improved.     ROS:   Denies headache or dizziness.  Denies chest pain.  Denies shortness of breath.    MEDICATIONS  (STANDING):  folic acid 1 milliGRAM(s) Oral daily  methylPREDNISolone sodium succinate IVPB 1000 milliGRAM(s) IV Intermittent daily  multivitamin 1 Tablet(s) Oral daily  nicotine -   7 mG/24Hr(s) Patch 1 patch Transdermal daily  pantoprazole    Tablet 40 milliGRAM(s) Oral before breakfast  thiamine 100 milliGRAM(s) Oral daily      Vital Signs Last 24 Hrs  T(C): 36.3 (16 Oct 2020 07:45), Max: 36.6 (15 Oct 2020 17:50)  T(F): 97.3 (16 Oct 2020 07:45), Max: 97.8 (15 Oct 2020 17:50)  HR: 57 (16 Oct 2020 07:45) (57 - 76)  BP: 123/80 (16 Oct 2020 07:45) (123/65 - 153/75)  RR: 20 (16 Oct 2020 07:45) (16 - 20)  SpO2: 100% (16 Oct 2020 07:45) (95% - 100%)    Detailed Neurologic Exam:    Mental status: The patient is awake and alert. There is no aphasia. There is no dysarthria.     Cranial nerves: Right afferent pupillary defect, mild.  Blurred vision in right eye, subjective improvement form 10/14/2020. There is no visual field deficit to confrontation. Extraocular motion is full with no nystagmus. There is no ptosis. Facial sensation is intact. Facial musculature is symmetric. Palate elevates symmetrically. Shoulder shrug is normal. Tongue is midline.      Motor: There is normal bulk and tone.  There is no tremor.  Strength grossly 5/5 bilaterally.    Sensation: Grossly intact to light touch and pin.    Reflexes: 2+ throughout and plantar responses are flexor.    Cerebellar: No dysmetria on finger nose testing.    Labs:     10-15    138  |  103  |  19.0  ----------------------------<  184<H>  4.5   |  20.0<L>  |  0.58    Ca    9.2      15 Oct 2020 03:15  Phos  3.1     10-15  Mg     2.0     10-15    TPro  7.7  /  Alb  4.7  /  TBili  0.3<L>  /  DBili  x   /  AST  31  /  ALT  70<H>  /  AlkPhos  69  10-15                            14.8   8.44  )-----------( 177      ( 15 Oct 2020 03:15 )             44.6       Rad:   MRI brain/cervical/thoracic images reviewed. There is a single punctate focus of white matter hyperintensity in left parietal region with no enhancement. There is no cord signal abnormality.

## 2020-10-16 NOTE — PROGRESS NOTE ADULT - ASSESSMENT
39y Male who is followed by neurology because of right optic neuritis    Optic neuritis.  IV solumedrol 1 gram daily for three doses completed today.   MRIs as above not suggestive of multiple sclerosis.  Will need continued surveillance as outpatient.   NMO antibodies pending.  Would advise no driving until cleared by ophthalmology.     Tobacco use   Advised to discontinue smoking.     Discharge planning.   Cleared from neurologic standpoint.     All above discussed with Dr Cochran.

## 2020-10-20 LAB — AQP4 H2O CHANNEL AB SERPL IA-ACNC: NEGATIVE — SIGNIFICANT CHANGE UP

## 2020-10-29 PROCEDURE — 80053 COMPREHEN METABOLIC PANEL: CPT

## 2020-10-29 PROCEDURE — 86769 SARS-COV-2 COVID-19 ANTIBODY: CPT

## 2020-10-29 PROCEDURE — U0003: CPT

## 2020-10-29 PROCEDURE — 36415 COLL VENOUS BLD VENIPUNCTURE: CPT

## 2020-10-29 PROCEDURE — 99285 EMERGENCY DEPT VISIT HI MDM: CPT | Mod: 25

## 2020-10-29 PROCEDURE — 84443 ASSAY THYROID STIM HORMONE: CPT

## 2020-10-29 PROCEDURE — 70553 MRI BRAIN STEM W/O & W/DYE: CPT

## 2020-10-29 PROCEDURE — 80307 DRUG TEST PRSMV CHEM ANLYZR: CPT

## 2020-10-29 PROCEDURE — T1013: CPT

## 2020-10-29 PROCEDURE — 80061 LIPID PANEL: CPT

## 2020-10-29 PROCEDURE — 86255 FLUORESCENT ANTIBODY SCREEN: CPT

## 2020-10-29 PROCEDURE — 96374 THER/PROPH/DIAG INJ IV PUSH: CPT

## 2020-10-29 PROCEDURE — 83036 HEMOGLOBIN GLYCOSYLATED A1C: CPT

## 2020-10-29 PROCEDURE — 85025 COMPLETE CBC W/AUTO DIFF WBC: CPT

## 2020-10-29 PROCEDURE — 86038 ANTINUCLEAR ANTIBODIES: CPT

## 2020-10-29 PROCEDURE — 72156 MRI NECK SPINE W/O & W/DYE: CPT

## 2020-10-29 PROCEDURE — 83735 ASSAY OF MAGNESIUM: CPT

## 2020-10-29 PROCEDURE — 85652 RBC SED RATE AUTOMATED: CPT

## 2020-10-29 PROCEDURE — 86140 C-REACTIVE PROTEIN: CPT

## 2020-10-29 PROCEDURE — 72157 MRI CHEST SPINE W/O & W/DYE: CPT

## 2020-10-29 PROCEDURE — 84100 ASSAY OF PHOSPHORUS: CPT

## 2020-10-29 PROCEDURE — 85027 COMPLETE CBC AUTOMATED: CPT

## 2020-10-29 PROCEDURE — 85610 PROTHROMBIN TIME: CPT

## 2021-05-21 ENCOUNTER — EMERGENCY (EMERGENCY)
Facility: HOSPITAL | Age: 41
LOS: 1 days | Discharge: HOME | End: 2021-05-21
Attending: EMERGENCY MEDICINE
Payer: MEDICAID

## 2021-05-21 VITALS
HEIGHT: 66 IN | DIASTOLIC BLOOD PRESSURE: 87 MMHG | OXYGEN SATURATION: 98 % | HEART RATE: 70 BPM | WEIGHT: 229.94 LBS | RESPIRATION RATE: 18 BRPM | SYSTOLIC BLOOD PRESSURE: 163 MMHG | TEMPERATURE: 98 F

## 2021-05-21 PROBLEM — Z78.9 OTHER SPECIFIED HEALTH STATUS: Chronic | Status: ACTIVE | Noted: 2020-10-14

## 2021-05-21 PROCEDURE — 73564 X-RAY EXAM KNEE 4 OR MORE: CPT | Mod: 26,RT

## 2021-05-21 PROCEDURE — 99283 EMERGENCY DEPT VISIT LOW MDM: CPT

## 2021-05-21 PROCEDURE — 73564 X-RAY EXAM KNEE 4 OR MORE: CPT

## 2021-05-21 PROCEDURE — 99283 EMERGENCY DEPT VISIT LOW MDM: CPT | Mod: 25

## 2021-05-21 RX ORDER — IBUPROFEN 200 MG
1 TABLET ORAL
Qty: 40 | Refills: 0
Start: 2021-05-21 | End: 2021-05-30

## 2021-05-21 RX ORDER — METHOCARBAMOL 500 MG/1
1500 TABLET, FILM COATED ORAL ONCE
Refills: 0 | Status: COMPLETED | OUTPATIENT
Start: 2021-05-21 | End: 2021-05-21

## 2021-05-21 RX ORDER — METHOCARBAMOL 500 MG/1
2 TABLET, FILM COATED ORAL
Qty: 12 | Refills: 0
Start: 2021-05-21 | End: 2021-05-23

## 2021-05-21 RX ORDER — IBUPROFEN 200 MG
600 TABLET ORAL ONCE
Refills: 0 | Status: COMPLETED | OUTPATIENT
Start: 2021-05-21 | End: 2021-05-21

## 2021-05-21 RX ADMIN — Medication 600 MILLIGRAM(S): at 08:31

## 2021-05-21 RX ADMIN — METHOCARBAMOL 1500 MILLIGRAM(S): 500 TABLET, FILM COATED ORAL at 08:31

## 2021-05-21 NOTE — ED PROVIDER NOTE - CARE PROVIDER_API CALL
Yee Estrada)  Orthopedics  [address] [place] [address], [address]  [place] [address]  Phone: [phone]  Fax: [phone]  Follow Up Time: 7-10 Days

## 2021-05-21 NOTE — ED PROVIDER NOTE - PATIENT PORTAL LINK FT
You can access the FollowMyHealth Patient Portal offered by [facility] by registering at the following website: [email or web address]. By joining [employer]’s FollowMyHealth portal, you will also be able to view your health information using other applications (apps) compatible with our system.

## 2021-05-21 NOTE — ED PROVIDER NOTE - PHYSICAL EXAMINATION
Gen: Well appearing in NAD  Head: NC/AT  Neck: trachea midline  Resp:  No distress  Ext: no deformities  RLE: + knee effusion + TTP over anterior superior patella + palpable effusion warm to touch no erythema. no palpable deformity NTTP over posterior knee  no calf tenderness. limited active/passive movement due to pain. limited straight leg raise. 2+ dp brisk cap refill. + plantar and dorsiflexion.   Neuro:  A&O appears non focal  Skin:  Warm and dry as visualized  Psych:  Normal affect and mood

## 2021-05-21 NOTE — ED PROVIDER NOTE - CLINICAL SUMMARY MEDICAL DECISION MAKING FREE TEXT BOX
41 yo male nontoxic appearing stable vitals afebrile presenting to Er with 2 days of atraumatic progressively worsening right knee pain, limited ROM due to pain. + effusion palpable to anterior superior knee joint. neurovascularly intact. no signs of secondary infection to the knee. xray to assess structure, pain control and re-eval 39 yo male nontoxic appearing stable vitals afebrile presenting to Er with 2 days of atraumatic progressively worsening right knee pain, limited ROM due to pain. + effusion palpable to anterior superior knee joint. neurovascularly intact. no signs of secondary infection to the knee. xray without noted fracture, pain improvement after po medications, placed in knee immobilizer and crutches given, referred to ORTHO

## 2021-05-21 NOTE — ED PROVIDER NOTE - OBJECTIVE STATEMENT
GONSALO  41 yo male no reported pmhx presenting to the ER with atraumatic right knee pain states that pain started wednesday into thursday waking him up from sleep reports a  tightness to above the knee, pain worse to the touch and with movements. denies prior accidents or injuries to the knee. reports being a  and has been painting on his knees but using knee pads. no relief with use of advil last taken yesterday. denies back pain, denies current numbness or tingling to the foot.   + smoker + eoth denies illicit drug use   no allergies to medications

## 2021-05-21 NOTE — ED PROVIDER NOTE - PROGRESS NOTE DETAILS
PERNELL KNOTT: no noted fracture, pain improvement s/p meds, placed in knee immobilizer and crutches advised on pain control fu and strict return precautions

## 2021-05-21 NOTE — ED PROVIDER NOTE - NSFOLLOWUPINSTRUCTIONS_ED_ALL_ED_FT
REST ICE AND ELEVATE the KNEE   please take prescribed medication as directed   new or worsening symptoms such as numbness tingling, increased swelling or worsening pain return to the ER immediately

## 2021-05-21 NOTE — ED PROVIDER NOTE - ATTENDING CONTRIBUTION TO CARE
41 yo male nontoxic appearing stable vitals afebrile presenting to Er with 2 days of atraumatic progressively worsening right knee pain, limited ROM due to pain. + effusion palpable to anterior superior knee joint. neurovascularly intact. no signs of secondary infection to the knee. xray without noted fracture, pain improvement after po medications, placed in knee immobilizer and crutches given, referred to ORTHO

## 2021-05-22 NOTE — ED POST DISCHARGE NOTE - DETAILS
Advised to keep knee immobilizer in place and NWB status. Will follow up with orthopaedic MD. ED : Rita

## 2021-06-17 NOTE — ED ADULT TRIAGE NOTE - NSWEIGHTCALCTOOLDRUG_GEN_A_CORE
The patient is Stable - Low risk of patient condition declining or worsening    Shift Goals  Clinical Goals: pain control and rest  Patient Goals: Rest and PO intake  Family Goals: Update on plan of care    Progress made toward(s) clinical / shift goals:  Pain managed throughout shift.      Problem: Pain - Standard  Goal: Alleviation of pain or a reduction in pain to the patient’s comfort goal  Outcome: Progressing       Patient is not progressing towards the following goals:        used

## 2024-06-12 NOTE — ED PROVIDER NOTE - CROS ED ROS STATEMENT
6/12/2024      Vernell Hansen  [address]  [place] [address]      Dear Colleague,    Thank you for referring your patient, Vernell Hansen, to the [facility] [place]. Please see a copy of my visit note below.    Vernell returns for bilateral knee injections with euflexxa #2  Diagnosis; bilateral knee arthritis  PROCEDURE:           bilateral  Knee joint injection with euflexxa #2    The patient was apprised of the risks and the benefits of the procedure and consented and a written consent was signed.   The patient s  KNEEs were sterilely prepped with chloraprep.     The patient was injected with euflexxa syringe into the right and left knee with a 1.5-inch 22-gauge needle at the_superiorlateral aspect of their knee joint    There were no complications. The patient tolerated the procedure well. There was minimal bleeding.   The patient was instructed to ice the knees upon leaving clinic and refrain from overuse over the next 3 days.   The patient was instructed to go to the emergency room with any usual pain, swelling, or redness occurred in the injected area.   The patient was given a followup appointment to evaluate response to the injection to their increased range of motion and reduction of pain.  Follow up for euflexxa  Dr Bruce Castillo           Large Joint Injection: bilateral knee    Date/Time: 6/12/2024 11:13 AM    Performed by: Bruce Castillo MD  Authorized by: Bruce Castillo MD    Indications:  Pain and osteoarthritis  Needle Size:  22 G  Guidance: landmark guided    Approach:  Superolateral  Location:  Knee  Laterality:  Bilateral      Medications (Right):  20 mg sodium hyaluronate 20 MG/2ML; 3 mL lidocaine 1 %  Medications (Left):  20 mg sodium hyaluronate 20 MG/2ML; 3 mL lidocaine 1 %  Outcome:  Tolerated well, no immediate complications  Procedure discussed: discussed risks, benefits, and alternatives    Consent Given by:  Patient  Timeout:  timeout called immediately prior to procedure    Prep: patient was prepped and draped in usual sterile fashion          Again, thank you for allowing me to participate in the care of your patient.        Sincerely,        Bruce Castillo MD   all other ROS negative except as per HPI